# Patient Record
(demographics unavailable — no encounter records)

---

## 2024-10-30 NOTE — HISTORY OF PRESENT ILLNESS
[FreeTextEntry1] : 68 year old P1 () F w symptomatic prolapse for <1 year detected on examination by Gynecologist - Dr Nisha Aguirre She can palpate the prolapse and has manually reduced it on her own  DTF   3-4 hrs Nocturia    3-4 UUI  denies, although reports urgency BRADLEY    a few times / month  PPD   0  Bowel movements 1-3 days reports straining with self disimpaction she does endorse eating plenty of fruits & vegetables   PMHx no cardiac hx HTN no diabetes  PSHx lumpectomy  Social hx never smoker no alcohol   PVR =  0 cc

## 2024-10-30 NOTE — PHYSICAL EXAM
[General Appearance - Well Developed] : well developed [General Appearance - Well Nourished] : well nourished [Normal Appearance] : normal appearance [Well Groomed] : well groomed [General Appearance - In No Acute Distress] : no acute distress [] : no respiratory distress [Abdomen Soft] : soft [Abdomen Tenderness] : non-tender [Urinary Bladder Findings] : the bladder was normal on palpation [Normal Station and Gait] : the gait and station were normal for the patient's age [Skin Color & Pigmentation] : normal skin color and pigmentation [Mood] : the mood was normal [Affect] : the affect was normal [Not Anxious] : not anxious [de-identified] : stage 2 cystocele stage 1-2 rectocele incomplete uterovaginal prolapse  POPQ 0      0         -3 4      2        9 -1     -1       -4 [Chaperone Present] : A chaperone was present in the examining room during all aspects of the physical examination [[id number]] : A chaperone was present during the pelvic exam. [FreeTextEntry2] :  Pelvic exam chaperoned by Carie Cruz, Medical Assistant

## 2024-10-30 NOTE — ASSESSMENT
[FreeTextEntry1] : Counseled the patient on constipation and how it can affect the urinary tract. We discussed increasing water intake, daily fruits and vegetables, and sources of fiber.  We recommended 4 servings of whole fruit per day, excluding dried fruit or juices.    - will also refer to GI  We discussed the etiology and management of pelvic organ prolapse.  We discussed conservative non-operative interventions including weight loss, pelvic floor PT, and use of a pessary device.  We discussed surgical interventions, including vaginal and abdominal approaches.  We discussed the r/b/a of obliterative vaginal surgery, or colpocleisis.  We also discussed vaginal native tissue prolapse repair. We also discussed abdominal sacrocolpopexy with mesh.   PLAN  she would like referral to PFPT would like to defer pessary or surgery - RTO 1 year  refer to GI for constipation